# Patient Record
Sex: MALE | Race: WHITE | NOT HISPANIC OR LATINO | Employment: FULL TIME | URBAN - NONMETROPOLITAN AREA
[De-identification: names, ages, dates, MRNs, and addresses within clinical notes are randomized per-mention and may not be internally consistent; named-entity substitution may affect disease eponyms.]

---

## 2017-01-25 ENCOUNTER — OFFICE VISIT (OUTPATIENT)
Dept: FAMILY MEDICINE CLINIC | Facility: CLINIC | Age: 22
End: 2017-01-25

## 2017-01-25 VITALS
SYSTOLIC BLOOD PRESSURE: 154 MMHG | HEART RATE: 98 BPM | DIASTOLIC BLOOD PRESSURE: 92 MMHG | OXYGEN SATURATION: 98 % | HEIGHT: 71 IN | WEIGHT: 276 LBS | TEMPERATURE: 98.3 F | BODY MASS INDEX: 38.64 KG/M2

## 2017-01-25 DIAGNOSIS — K64.5 HEMORRHOID THROMBOSIS: ICD-10-CM

## 2017-01-25 DIAGNOSIS — R06.83 SNORING: Primary | ICD-10-CM

## 2017-01-25 PROCEDURE — 90471 IMMUNIZATION ADMIN: CPT | Performed by: FAMILY MEDICINE

## 2017-01-25 PROCEDURE — 99213 OFFICE O/P EST LOW 20 MIN: CPT | Performed by: FAMILY MEDICINE

## 2017-01-25 PROCEDURE — 90715 TDAP VACCINE 7 YRS/> IM: CPT | Performed by: FAMILY MEDICINE

## 2017-01-25 NOTE — PROGRESS NOTES
Subjective   Aime He is a 21 y.o. male.     History of Present Illness     Pain bottom 2 days. Hurts to walk.  Never had a sleep study, hasn't been able to get off work?  Has told me in past snores and witnessed apnea.     Review of Systems   Constitutional: Negative for chills, fatigue and fever.   HENT: Negative for congestion, ear discharge, ear pain, facial swelling, hearing loss, postnasal drip, rhinorrhea, sinus pressure, sore throat, trouble swallowing and voice change.    Eyes: Negative for discharge, redness and visual disturbance.   Respiratory: Negative for cough, chest tightness, shortness of breath and wheezing.    Cardiovascular: Negative for chest pain and palpitations.   Gastrointestinal: Negative for abdominal pain, blood in stool, constipation, diarrhea, nausea and vomiting.   Endocrine: Negative for polydipsia and polyuria.   Genitourinary: Negative for dysuria, flank pain, hematuria and urgency.   Musculoskeletal: Negative for arthralgias, back pain, joint swelling and myalgias.   Skin: Negative for rash.   Neurological: Negative for dizziness, weakness, numbness and headaches.   Hematological: Negative for adenopathy.   Psychiatric/Behavioral: Negative for confusion and sleep disturbance. The patient is not nervous/anxious.        Objective   Physical Exam   Constitutional: He is oriented to person, place, and time. He appears well-developed and well-nourished.   HENT:   Head: Normocephalic and atraumatic.   Right Ear: External ear normal.   Left Ear: External ear normal.   Nose: Nose normal.   Eyes: Conjunctivae and EOM are normal. Pupils are equal, round, and reactive to light.   Neck: Normal range of motion.   Pulmonary/Chest: Effort normal.   Musculoskeletal: Normal range of motion.   Neurological: He is alert and oriented to person, place, and time.   Skin:   Right side of anus, thrombosed hemorrhoid.   Psychiatric: He has a normal mood and affect. His behavior is normal.  Judgment and thought content normal.   Nursing note and vitals reviewed.      Assessment/Plan   Buddhist was seen today for hemorrhoids.    Diagnoses and all orders for this visit:    Snoring  -     Ambulatory Referral to Sleep Medicine    Hemorrhoid thrombosis      Hot baths and time.  Offered to make incision.  Opted for time.   Use otc anusol and keep bowels soft.

## 2017-01-25 NOTE — MR AVS SNAPSHOT
Aime He   2017 11:15 AM   Office Visit    Dept Phone:  [phone]   Encounter #:  [id number]    Provider:  Lenin Carlson MD   Department:  [facility]                Your Full Care Plan              Today's Medication Changes          These changes are accurate as of: 17 11:26 AM.  If you have any questions, ask your nurse or doctor.               Stop taking medication(s)listed here:     famotidine 40 MG tablet   Commonly known as:  PEPCID   Stopped by:  Lenin Carlson MD           omeprazole 20 MG capsule   Commonly known as:  priLOSEC   Stopped by:  Lenin Carlson MD                      Your Updated Medication List      Notice  As of 2017 11:26 AM    You have not been prescribed any medications.            We Performed the Following     Ambulatory Referral to Sleep Medicine       You Were Diagnosed With        Codes Comments    Snoring    -  Primary ICD-10-CM: R06.83  ICD-9-CM: 786.09     Hemorrhoid thrombosis     ICD-10-CM: K64.5  ICD-9-CM: 455.7       Instructions     None    Patient Instructions History      Upcoming Appointments     Visit Type Date Time Department    OFFICE VISIT 2017 11:15 AM [facility]      [employer] Signup     [facility] [email or web address] allows you to send messages to your doctor, view your test results, renew your prescriptions, schedule appointments, and more. To sign up, go to [employer] and click on the Sign Up Now link in the New User? box. Enter your [employer] Activation Code exactly as it appears below along with the last four digits of your Social Security Number and your Date of Birth () to complete the sign-up process. If you do not sign up before the expiration date, you must request a new code.    [employer] Activation Code: [email or web address]  Expires: 2017 11:25 AM    If you have questions, you can email [email or web address] or call [phone] to talk to  "our MyChart staff. Remember, MyChart is NOT to be used for urgent needs. For medical emergencies, dial 911.               Other Info from Your Visit           Allergies     Azithromycin      Latex      Pediox [Carbinoxamine Maleate]        Reason for Visit     Hemorrhoids           Vital Signs     Blood Pressure Pulse Temperature Height    154/92 (BP Location: Left arm, Patient Position: Sitting, Cuff Size: Adult) 98 98.3 °F (36.8 °C) (Temporal Artery ) 71\" (180.3 cm)    Weight Oxygen Saturation Body Mass Index Smoking Status    276 lb (125 kg) 98% 38.49 kg/m2 Light Tobacco Smoker      Problems and Diagnoses Noted     Snoring    -  Primary    Hemorrhoid thrombosis            "

## 2017-09-28 ENCOUNTER — OFFICE VISIT (OUTPATIENT)
Dept: FAMILY MEDICINE CLINIC | Facility: CLINIC | Age: 22
End: 2017-09-28

## 2017-09-28 VITALS
HEART RATE: 110 BPM | WEIGHT: 253.2 LBS | SYSTOLIC BLOOD PRESSURE: 140 MMHG | OXYGEN SATURATION: 98 % | BODY MASS INDEX: 35.45 KG/M2 | HEIGHT: 71 IN | DIASTOLIC BLOOD PRESSURE: 92 MMHG | TEMPERATURE: 98.5 F

## 2017-09-28 DIAGNOSIS — F41.9 ANXIETY: ICD-10-CM

## 2017-09-28 DIAGNOSIS — R06.83 SNORING: Primary | ICD-10-CM

## 2017-09-28 LAB — TSH SERPL DL<=0.05 MIU/L-ACNC: 2.96 MIU/ML (ref 0.46–4.68)

## 2017-09-28 PROCEDURE — 84443 ASSAY THYROID STIM HORMONE: CPT | Performed by: FAMILY MEDICINE

## 2017-09-28 PROCEDURE — 99213 OFFICE O/P EST LOW 20 MIN: CPT | Performed by: FAMILY MEDICINE

## 2017-09-28 PROCEDURE — 36415 COLL VENOUS BLD VENIPUNCTURE: CPT | Performed by: FAMILY MEDICINE

## 2017-09-28 RX ORDER — ESCITALOPRAM OXALATE 10 MG/1
10 TABLET ORAL NIGHTLY
Qty: 30 TABLET | Refills: 11 | Status: SHIPPED | OUTPATIENT
Start: 2017-09-28

## 2017-09-28 RX ORDER — HYDROXYZINE PAMOATE 25 MG/1
25 CAPSULE ORAL 3 TIMES DAILY PRN
Qty: 60 CAPSULE | Refills: 1 | Status: SHIPPED | OUTPATIENT
Start: 2017-09-28

## 2017-09-28 NOTE — PROGRESS NOTES
Subjective   Aime He is a 21 y.o. male.     History of Present Illness     Anxiety, irritable.  Never had sleep study.  Said no one called him back with a time  Working a lot, wants some time off.  Strong family history thyroid problems  Doesn't use caffeine    Review of Systems   Constitutional: Negative for chills, fatigue and fever.   HENT: Negative for congestion, ear discharge, ear pain, facial swelling, hearing loss, postnasal drip, rhinorrhea, sinus pressure, sore throat, trouble swallowing and voice change.    Eyes: Negative for discharge, redness and visual disturbance.   Respiratory: Negative for cough, chest tightness, shortness of breath and wheezing.    Cardiovascular: Negative for chest pain and palpitations.   Gastrointestinal: Negative for abdominal pain, blood in stool, constipation, diarrhea, nausea and vomiting.   Endocrine: Negative for polydipsia and polyuria.   Genitourinary: Negative for dysuria, flank pain, hematuria and urgency.   Musculoskeletal: Negative for arthralgias, back pain, joint swelling and myalgias.   Skin: Negative for rash.   Neurological: Negative for dizziness, weakness, numbness and headaches.   Hematological: Negative for adenopathy.   Psychiatric/Behavioral: Negative for confusion and sleep disturbance. The patient is nervous/anxious.        Objective   Physical Exam   Constitutional: He is oriented to person, place, and time. He appears well-developed and well-nourished.   HENT:   Head: Normocephalic and atraumatic.   Right Ear: External ear normal.   Left Ear: External ear normal.   Nose: Nose normal.   Eyes: Conjunctivae and EOM are normal. Pupils are equal, round, and reactive to light.   Neck: Normal range of motion.   Pulmonary/Chest: Effort normal.   Musculoskeletal: Normal range of motion.   Neurological: He is alert and oriented to person, place, and time.   Psychiatric: He has a normal mood and affect. His behavior is normal. Judgment and thought  content normal.   Nursing note and vitals reviewed.      Assessment/Plan   Aime was seen today for anxiety.    Diagnoses and all orders for this visit:    Snoring  -     Ambulatory Referral to Sleep Medicine    Anxiety  -     TSH    Other orders  -     escitalopram (LEXAPRO) 10 MG tablet; Take 1 tablet by mouth Every Night.  -     hydrOXYzine (VISTARIL) 25 MG capsule; Take 1 capsule by mouth 3 (Three) Times a Day As Needed for Itching.        Went over medicines.  If medicine makes suicidal or homocidal, stop.  Drowsiness, possible sexual side effects, possible increased anxiety at first.

## 2023-10-23 ENCOUNTER — TELEPHONE (OUTPATIENT)
Dept: UROLOGY | Facility: CLINIC | Age: 28
End: 2023-10-23

## 2023-10-23 NOTE — TELEPHONE ENCOUNTER
Provider: DR. CORRALES    Caller: Aime He    Relationship to Patient: Self     Phone Number: [phone]    Reason for Call: CANCEL APPOINTMENT    When was the patient last seen: NEW PATIENT    Notes: MR. SUTTON CALLED TO CANCEL TODAY'S APPOINTMENT (10/23/23 @ 10:50 AM). PATIENT DECLINED RESCHEDULING AT THIS TIME.